# Patient Record
Sex: FEMALE | Race: WHITE | URBAN - METROPOLITAN AREA
[De-identification: names, ages, dates, MRNs, and addresses within clinical notes are randomized per-mention and may not be internally consistent; named-entity substitution may affect disease eponyms.]

---

## 2024-01-10 ENCOUNTER — OFFICE VISIT (OUTPATIENT)
Dept: FAMILY MEDICINE CLINIC | Facility: CLINIC | Age: 29
End: 2024-01-10
Payer: COMMERCIAL

## 2024-01-10 ENCOUNTER — LAB ENCOUNTER (OUTPATIENT)
Dept: LAB | Age: 29
End: 2024-01-10
Attending: FAMILY MEDICINE
Payer: COMMERCIAL

## 2024-01-10 VITALS
DIASTOLIC BLOOD PRESSURE: 78 MMHG | RESPIRATION RATE: 16 BRPM | SYSTOLIC BLOOD PRESSURE: 120 MMHG | OXYGEN SATURATION: 98 % | WEIGHT: 270 LBS | HEIGHT: 65 IN | HEART RATE: 72 BPM | BODY MASS INDEX: 44.98 KG/M2

## 2024-01-10 DIAGNOSIS — G44.85 PRIMARY STABBING HEADACHE: Primary | ICD-10-CM

## 2024-01-10 DIAGNOSIS — G44.85 PRIMARY STABBING HEADACHE: ICD-10-CM

## 2024-01-10 LAB
ALBUMIN SERPL-MCNC: 4.3 G/DL (ref 3.2–4.8)
ALBUMIN/GLOB SERPL: 1.2 {RATIO} (ref 1–2)
ALP LIVER SERPL-CCNC: 86 U/L
ALT SERPL-CCNC: 16 U/L
ANION GAP SERPL CALC-SCNC: 3 MMOL/L (ref 0–18)
AST SERPL-CCNC: 15 U/L (ref ?–34)
BILIRUB SERPL-MCNC: 0.6 MG/DL (ref 0.3–1.2)
BUN BLD-MCNC: 8 MG/DL (ref 9–23)
BUN/CREAT SERPL: 12.9 (ref 10–20)
CALCIUM BLD-MCNC: 9.5 MG/DL (ref 8.7–10.4)
CHLORIDE SERPL-SCNC: 105 MMOL/L (ref 98–112)
CO2 SERPL-SCNC: 28 MMOL/L (ref 21–32)
CREAT BLD-MCNC: 0.62 MG/DL
EGFRCR SERPLBLD CKD-EPI 2021: 125 ML/MIN/1.73M2 (ref 60–?)
EST. AVERAGE GLUCOSE BLD GHB EST-MCNC: 105 MG/DL (ref 68–126)
FASTING STATUS PATIENT QL REPORTED: NO
GLOBULIN PLAS-MCNC: 3.7 G/DL (ref 2.8–4.4)
GLUCOSE BLD-MCNC: 78 MG/DL (ref 70–99)
HBA1C MFR BLD: 5.3 % (ref ?–5.7)
OSMOLALITY SERPL CALC.SUM OF ELEC: 279 MOSM/KG (ref 275–295)
POTASSIUM SERPL-SCNC: 4.2 MMOL/L (ref 3.5–5.1)
PROT SERPL-MCNC: 8 G/DL (ref 5.7–8.2)
SODIUM SERPL-SCNC: 136 MMOL/L (ref 136–145)
TSI SER-ACNC: 1.32 MIU/ML (ref 0.55–4.78)

## 2024-01-10 PROCEDURE — 84443 ASSAY THYROID STIM HORMONE: CPT

## 2024-01-10 PROCEDURE — 80053 COMPREHEN METABOLIC PANEL: CPT

## 2024-01-10 PROCEDURE — 83036 HEMOGLOBIN GLYCOSYLATED A1C: CPT

## 2024-01-10 PROCEDURE — 3008F BODY MASS INDEX DOCD: CPT | Performed by: FAMILY MEDICINE

## 2024-01-10 PROCEDURE — 99203 OFFICE O/P NEW LOW 30 MIN: CPT | Performed by: FAMILY MEDICINE

## 2024-01-10 PROCEDURE — 85060 BLOOD SMEAR INTERPRETATION: CPT

## 2024-01-10 PROCEDURE — 36415 COLL VENOUS BLD VENIPUNCTURE: CPT

## 2024-01-10 PROCEDURE — 3078F DIAST BP <80 MM HG: CPT | Performed by: FAMILY MEDICINE

## 2024-01-10 PROCEDURE — 3074F SYST BP LT 130 MM HG: CPT | Performed by: FAMILY MEDICINE

## 2024-01-10 PROCEDURE — 85027 COMPLETE CBC AUTOMATED: CPT

## 2024-01-10 RX ORDER — CYCLOBENZAPRINE HCL 5 MG
5 TABLET ORAL NIGHTLY PRN
Qty: 30 TABLET | Refills: 0 | Status: SHIPPED | OUTPATIENT
Start: 2024-01-10

## 2024-01-10 NOTE — PROGRESS NOTES
HPI: Tammy is a 28 year old female who presents for establishment of care. Used to be seen at small clinic in [place]. Single. 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     Pt has been having frequent, short headaches for about 2 months. Pt thought it was related to stress and teeth clenching but headaches has been more constant. Happens several times per day.  Stabbing pain around right temple and behind right eye.  Lasts about 20-30 seconds. Fades away and then comes back.  Short bursts for about 10 min then nothing for hours. Has to stop what she is doing and often put pressure on head. Pain does not wake her. Had headaches in July which woke her up and lasted for 3 days. Does not wake with headaches. Maternal cousin had aneurysm at 17. Has tried Ibuprofen for the pain but it is usually over before she gets to take it. Eye massager helps.     States she clenches jaw in the day and night. Sees therapist regularly.     PMH:    Past Medical History:   Diagnosis Date    Allergic rhinitis     Headache       Alg:  Patient has no known allergies.   Meds:   No current outpatient medications on file prior to visit.     No current facility-administered medications on file prior to visit.      Tobacco Use: no    ROS: see HPI    Objective:   Gen: AOx3. NAD.   /78 (BP Location: Left arm, Patient Position: Sitting, Cuff Size: large)   Pulse 72   Resp 16   Ht 5' 5\" (1.651 m)   Wt 270 lb (122.5 kg)   LMP 12/18/2023 (Exact Date)   SpO2 98%   BMI 44.93 kg/m²   CV:  Regular rate and rhythm; no murmurs  Lungs:  Clear to ausculation; good aeration               No wheezes, rales or rhonchi  Neuro: AOx3. PERRLA, EOMI.  Cranial nerves grossly intact.  Muscle strength 5/5 bilaterally.  Normal reflexes. Normal gait. Normal heel to toe. Negative rhomberg. No pronator drift      Assessment:/Plan:  Encounter Diagnosis   Name Primary?    Primary stabbing headache Yes       Unclear etiology. Normal Neuro exam.  Headaches have been more  frequent and painful.  Will get MRI/MRA as headaches are daily and severe.  Try nightly low dose muscle relaxer to see if it helps with clenching.     Colleen Weiler, DO

## 2024-01-11 LAB
DEPRECATED RDW RBC AUTO: 43.8 FL (ref 35.1–46.3)
ERYTHROCYTE [DISTWIDTH] IN BLOOD BY AUTOMATED COUNT: 16.3 % (ref 11–15)
HCT VFR BLD AUTO: 41.4 %
HGB BLD-MCNC: 11.9 G/DL
MCH RBC QN AUTO: 21.6 PG (ref 26–34)
MCHC RBC AUTO-ENTMCNC: 28.7 G/DL (ref 31–37)
MCV RBC AUTO: 75.1 FL
PLATELET # BLD AUTO: 314 10(3)UL (ref 150–450)
RBC # BLD AUTO: 5.51 X10(6)UL
WBC # BLD AUTO: 10.3 X10(3) UL (ref 4–11)

## 2024-02-20 ENCOUNTER — HOSPITAL ENCOUNTER (OUTPATIENT)
Dept: MRI IMAGING | Age: 29
Discharge: HOME | End: 2024-02-20
Attending: FAMILY MEDICINE
Payer: COMMERCIAL

## 2024-02-20 DIAGNOSIS — G44.85 PRIMARY STABBING HEADACHE: ICD-10-CM

## 2024-02-20 PROCEDURE — A9575 INJ GADOTERATE MEGLUMI 0.1ML: HCPCS | Performed by: FAMILY MEDICINE

## 2024-02-20 PROCEDURE — 70547 MR ANGIOGRAPHY NECK W/O DYE: CPT | Performed by: FAMILY MEDICINE

## 2024-02-20 PROCEDURE — 70553 MRI BRAIN STEM W/O & W/DYE: CPT | Performed by: FAMILY MEDICINE

## 2024-02-20 PROCEDURE — 70544 MR ANGIOGRAPHY HEAD W/O DYE: CPT | Performed by: FAMILY MEDICINE

## 2024-02-20 RX ORDER — GADOTERATE MEGLUMINE 376.9 MG/ML
20 INJECTION INTRAVENOUS
Status: COMPLETED | OUTPATIENT
Start: 2024-02-20 | End: 2024-02-20

## 2024-02-20 RX ADMIN — GADOTERATE MEGLUMINE 20 ML: 376.9 INJECTION INTRAVENOUS at 16:11:00

## 2024-09-10 ENCOUNTER — OFFICE VISIT (OUTPATIENT)
Dept: FAMILY MEDICINE CLINIC | Facility: CLINIC | Age: 29
End: 2024-09-10
Payer: COMMERCIAL

## 2024-09-10 VITALS
SYSTOLIC BLOOD PRESSURE: 118 MMHG | DIASTOLIC BLOOD PRESSURE: 81 MMHG | HEART RATE: 81 BPM | BODY MASS INDEX: 45 KG/M2 | RESPIRATION RATE: 16 BRPM | TEMPERATURE: 98 F | WEIGHT: 269 LBS

## 2024-09-10 DIAGNOSIS — L02.92 BOIL: Primary | ICD-10-CM

## 2024-09-10 PROCEDURE — 99213 OFFICE O/P EST LOW 20 MIN: CPT | Performed by: FAMILY MEDICINE

## 2024-09-10 PROCEDURE — 3074F SYST BP LT 130 MM HG: CPT | Performed by: FAMILY MEDICINE

## 2024-09-10 PROCEDURE — 3079F DIAST BP 80-89 MM HG: CPT | Performed by: FAMILY MEDICINE

## 2024-09-10 RX ORDER — CEPHALEXIN 500 MG/1
500 CAPSULE ORAL 4 TIMES DAILY
Qty: 28 CAPSULE | Refills: 0 | Status: SHIPPED | OUTPATIENT
Start: 2024-09-10 | End: 2024-09-17

## 2024-09-10 NOTE — PROGRESS NOTES
HPI: Tammy is a 29 year old female who presents for right axilla lesion.  Started about 2 weeks ago. Had one in the same place earlier this Summer.  Left it alone and it got better.  Started again in the same spot.  Hurts to touch. No drainage. No fever.     PMH:    Past Medical History:    Allergic rhinitis    Headache      Alg:  Patient has no known allergies.   Meds:   No current outpatient medications on file prior to visit.     No current facility-administered medications on file prior to visit.      Tobacco Use: no    ROS: see HPI    Objective:   Gen: AOx3. NAD.   /81   Pulse 81   Temp 98 °F (36.7 °C) (Temporal)   Resp 16   Wt 269 lb (122 kg)   LMP 08/24/2024 (Exact Date)   BMI 44.76 kg/m²   Skin: 1 x 1/2cm raised, discolored lesion with thin coating.  Mild fluid fluctuance noted. Mild redness noted surrounding lesion    Assessment:/Plan:  Encounter Diagnosis   Name Primary?    Boil Yes       Advised aggressive warm compresses. Start Cephalexin.  If becoming larger or more painful, schedule appointment and will need to I&D.    Colleen Weiler, DO

## 2024-09-12 ENCOUNTER — PATIENT MESSAGE (OUTPATIENT)
Dept: FAMILY MEDICINE CLINIC | Facility: CLINIC | Age: 29
End: 2024-09-12

## 2024-09-13 NOTE — TELEPHONE ENCOUNTER
From: Tammy De La Cruz  To: Colleen Weiler  Sent: 9/12/2024 10:05 PM CDT  Subject: Boil Ruptured    Hi there,  My underarm boil ruptured at some point Tuesday night after a day of aggressive warm compresses. Since then, it has drained to the point where it is no longer raised.   However, it has been steadily bleeding since it ruptured. I have been keeping it covered with a bandaid. Should I be keeping it covered until it forms a scab? How long should I expect it to bleed for before I start to worry?    Thank you,    Tammy

## 2025-04-16 ENCOUNTER — OFFICE VISIT (OUTPATIENT)
Dept: FAMILY MEDICINE CLINIC | Facility: CLINIC | Age: 30
End: 2025-04-16
Payer: COMMERCIAL

## 2025-04-16 VITALS
HEART RATE: 73 BPM | SYSTOLIC BLOOD PRESSURE: 125 MMHG | DIASTOLIC BLOOD PRESSURE: 84 MMHG | WEIGHT: 261 LBS | BODY MASS INDEX: 43 KG/M2 | RESPIRATION RATE: 16 BRPM | TEMPERATURE: 98 F

## 2025-04-16 DIAGNOSIS — L42 PITYRIASIS ROSEA: Primary | ICD-10-CM

## 2025-04-16 PROCEDURE — 3074F SYST BP LT 130 MM HG: CPT | Performed by: FAMILY MEDICINE

## 2025-04-16 PROCEDURE — 99213 OFFICE O/P EST LOW 20 MIN: CPT | Performed by: FAMILY MEDICINE

## 2025-04-16 PROCEDURE — 3079F DIAST BP 80-89 MM HG: CPT | Performed by: FAMILY MEDICINE

## 2025-04-17 NOTE — PROGRESS NOTES
HPI: Tammy is a 30 year old female who presents for rash.  Started about one week ago. Has had eczema in the past. Not itchy but they are dry. Using lotion which helped.     PMH:  Past Medical History[1]   Alg:  Patient has no known allergies.   Meds: Medications Ordered Prior to Encounter[2]   Tobacco Use: no    Objective:   Gen: AOx3. NAD  /84   Pulse 73   Temp 97.9 °F (36.6 °C) (Temporal)   Resp 16   Wt 261 lb (118.4 kg)   LMP 08/24/2024 (Exact Date)   BMI 43.43 kg/m²   Skin: erythematous, flat patches noted to bilateral legs, abdomen and back    Assessment:/Plan:  Encounter Diagnosis   Name Primary?    Pityriasis rosea Yes       Discussed diagnosis.  Reassured patient that she is not contagious. Information printed.     Colleen Weiler, DO           [1]   Past Medical History:   Allergic rhinitis    Headache   [2]   No current outpatient medications on file prior to visit.     No current facility-administered medications on file prior to visit.